# Patient Record
Sex: MALE | Race: WHITE | Employment: FULL TIME | URBAN - METROPOLITAN AREA
[De-identification: names, ages, dates, MRNs, and addresses within clinical notes are randomized per-mention and may not be internally consistent; named-entity substitution may affect disease eponyms.]

---

## 2023-07-15 ENCOUNTER — HOSPITAL ENCOUNTER (EMERGENCY)
Age: 38
Discharge: HOME | End: 2023-07-15
Attending: EMERGENCY MEDICINE
Payer: COMMERCIAL

## 2023-07-15 VITALS
OXYGEN SATURATION: 98 % | HEART RATE: 93 BPM | HEIGHT: 69 IN | SYSTOLIC BLOOD PRESSURE: 123 MMHG | TEMPERATURE: 98.6 F | DIASTOLIC BLOOD PRESSURE: 82 MMHG | RESPIRATION RATE: 16 BRPM | WEIGHT: 185 LBS | BODY MASS INDEX: 27.4 KG/M2
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
## 2023-07-15 DIAGNOSIS — S50.862A NONVENOMOUS INSECT BITE OF LEFT FOREARM WITHOUT INFECTION, INITIAL ENCOUNTER: Primary | ICD-10-CM

## 2023-07-15 PROCEDURE — 6360000002 HC RX W HCPCS: Performed by: EMERGENCY MEDICINE

## 2023-07-15 PROCEDURE — 6370000000 HC RX 637 (ALT 250 FOR IP): Performed by: EMERGENCY MEDICINE

## 2023-07-15 PROCEDURE — 96372 THER/PROPH/DIAG INJ SC/IM: CPT | Performed by: EMERGENCY MEDICINE

## 2023-07-15 PROCEDURE — 99284 EMERGENCY DEPT VISIT MOD MDM: CPT | Performed by: EMERGENCY MEDICINE

## 2023-07-15 RX ORDER — DEXAMETHASONE SODIUM PHOSPHATE 10 MG/ML
8 INJECTION INTRAMUSCULAR; INTRAVENOUS
Status: COMPLETED | OUTPATIENT
Start: 2023-07-15 | End: 2023-07-15

## 2023-07-15 RX ORDER — HYDROXYZINE PAMOATE 25 MG/1
25 CAPSULE ORAL 3 TIMES DAILY PRN
Qty: 20 CAPSULE | Refills: 0 | Status: SHIPPED | OUTPATIENT
Start: 2023-07-15 | End: 2023-07-29

## 2023-07-15 RX ORDER — PREDNISONE 50 MG/1
50 TABLET ORAL DAILY
Qty: 5 TABLET | Refills: 0 | Status: SHIPPED | OUTPATIENT
Start: 2023-07-15 | End: 2023-07-20

## 2023-07-15 RX ORDER — HYDROXYZINE PAMOATE 25 MG/1
25 CAPSULE ORAL
Status: COMPLETED | OUTPATIENT
Start: 2023-07-15 | End: 2023-07-15

## 2023-07-15 RX ADMIN — HYDROXYZINE PAMOATE 25 MG: 25 CAPSULE ORAL at 17:27

## 2023-07-15 RX ADMIN — DEXAMETHASONE SODIUM PHOSPHATE 8 MG: 10 INJECTION INTRAMUSCULAR; INTRAVENOUS at 17:27

## 2023-07-15 ASSESSMENT — PAIN - FUNCTIONAL ASSESSMENT: PAIN_FUNCTIONAL_ASSESSMENT: 0-10

## 2023-07-15 ASSESSMENT — LIFESTYLE VARIABLES
HOW MANY STANDARD DRINKS CONTAINING ALCOHOL DO YOU HAVE ON A TYPICAL DAY: PATIENT DOES NOT DRINK
HOW OFTEN DO YOU HAVE A DRINK CONTAINING ALCOHOL: NEVER

## 2023-07-15 ASSESSMENT — PAIN DESCRIPTION - LOCATION: LOCATION: HAND

## 2023-07-15 ASSESSMENT — PAIN SCALES - GENERAL: PAINLEVEL_OUTOF10: 6

## 2023-07-15 ASSESSMENT — PAIN DESCRIPTION - ORIENTATION: ORIENTATION: LEFT

## 2023-07-15 NOTE — ED PROVIDER NOTES
Emergency Department Provider Note       PCP: None None   Age: 40 y.o. Sex: male     DISPOSITION Decision To Discharge 07/15/2023 05:20:33 PM       ICD-10-CM    1. Nonvenomous insect bite of left forearm without infection, initial encounter  G55.633B     W57. [facility]           Medical Decision Making     Complexity of Problems Addressed:  1 or more acute illnesses that pose a threat to life or bodily function. Data Reviewed and Analyzed:  Category 1:   I independently ordered and reviewed each unique test.  I reviewed external records: provider visit note from PCP. Category 2:       Category 3: Discussion of management or test interpretation. Thom Mcallister is a 40 y.o. male who presents to the Emergency Department with chief complaint of    Chief Complaint   Patient presents with    Insect Bite     Been sting      Patient is a 81-year-old male who states he was stung by yellow jackets in the left hand and left upper arm approximately 1 hour prior to arrival.  Patient states he took 2 Benadryl and had some improvement. Patient denies any tongue swelling or lip swelling or facial swelling or difficulty swallowing or breathing. Patient denies any wheezing. Differential diagnosis includes but is not limited to allergic reaction, anaphylaxis. Patient's physical exam is unremarkable other than erythematous rash involving left hand and left upper extremity. Patient has no wheezing and no stridor and no difficulty breathing on exam.    Patient did receive Atarax 25 mg p.o. here as well as a Decadron 8 mg IM injection. Patient has been discharged home with the same and instructed to return for any worsening symptoms. Risk of Complications and/or Morbidity of Patient Management:  Prescription drug management performed. Shared medical decision making was utilized in creating the patients health plan today.          History      Thom Mcallister is a 40 y.o. male who presents to the Emergency

## 2023-07-15 NOTE — ED TRIAGE NOTES
Pt ambulatory to triage. Pt reports being stung by yellow jackets to left hand and left upper arm. Pt states he took two benadryl approximately an hour ago. Pt denies allergy to bees. Pt left hand with swelling. Pt denies difficulty swallowing or breathing. Pt in no respiratory distress in triage.